# Patient Record
Sex: FEMALE
[De-identification: names, ages, dates, MRNs, and addresses within clinical notes are randomized per-mention and may not be internally consistent; named-entity substitution may affect disease eponyms.]

---

## 2023-07-21 ENCOUNTER — NURSE TRIAGE (OUTPATIENT)
Dept: OTHER | Facility: CLINIC | Age: 33
End: 2023-07-21

## 2023-07-21 NOTE — TELEPHONE ENCOUNTER
Location of patient: [place]    Subjective: Caller states \"I am concerned about sexual issues\"     Current Symptoms:Caller had intercourse with a male last night and thought the condom looked dirty and is concerned about STI's. Caller went to  today and prescribed antibiotics. Denies any symptoms    Onset: 1 day    Associated Symptoms: STI concern    Pain Severity: None    Temperature: NA    What has been tried: NA    LMP: NA Pregnant: NA    Recommended disposition: Home care, already saw provider    Care advice provided, patient verbalizes understanding; denies any other questions or concerns; instructed to call back for any new or worsening symptoms. This triage is a result of a call to [address] [place] [address]. Please do not respond to the triage nurse through this encounter. Any subsequent communication should be directly with the patient.       Reason for Disposition   Preventing STIs, questions about    Protocols used: STI Questions-ADULT-

## 2023-09-05 PROBLEM — R92.8 ABNORMAL MAMMOGRAM: Status: ACTIVE | Noted: 2023-09-05

## 2023-09-05 PROBLEM — N60.99 BENIGN BREAST DISEASE: Status: ACTIVE | Noted: 2023-09-05

## 2023-09-05 RX ORDER — MOMETASONE FUROATE 1 MG/G
1 OINTMENT TOPICAL 2 TIMES DAILY
COMMUNITY

## 2023-09-05 RX ORDER — KETOCONAZOLE 20 MG/G
1 CREAM TOPICAL 2 TIMES DAILY
COMMUNITY

## 2023-09-05 RX ORDER — MULTIVITAMIN
TABLET ORAL
COMMUNITY
Start: 2022-03-21

## 2023-09-05 RX ORDER — CLOBETASOL PROPIONATE 0.5 MG/G
OINTMENT TOPICAL
COMMUNITY

## 2023-12-11 NOTE — PROGRESS NOTES
Aliya Zambrano female   1990 33 y.o.   [id number]      Chief Complaint  Biopsy consultation    History Of Present Illness  Aliya Zambrano is a very pleasant 33 year old AA female [facility]. She has family history of breast cancer in her paternal half-sister, age 31 with recurrence. Her sister was genetically tested, BRCA negative per patient report. She had right breast stereotactic biopsy 2022 revealing benign breast tissue with focal mild PASH, probably concordant with short term mammographic follow up recommended. She is still interested in Genetic testing for herself with BRCA 1 and BRCA 2 testing only, but has not scheduled appointment as of yet.      BREAST IMAGIN2023 Bilateral diagnostic mammogram and right breast ultrasound, indicates BI-RADS Category 4. Right breast, indeterminate distortion without sonographic correlate. May represent worsening architecture distortion or evolving post biopsy changes. Biopsy recommended given no tissue marker identified.     FEMALE HISTORY: menarche age 11, nulliparous, OCP's x 2 years, premenopausal, LMP 2023, no contraception at this time, desires future pregnancies, regular cycles, scattered fibroglandular tissue     FAMILY CANCER HISTORY:  Paternal Half Sister: Breast cancer, age 31 with recurrence, BRCA negative, alive and well at 50     Surgical History  She has a past surgical history that includes Other surgical history (2022).     Social History  She has no history on file for tobacco use, alcohol use, and drug use.    Family History  Family History   Problem Relation Name Age of Onset    Breast cancer Father's Sister          Half-sister        Allergies  Patient has no known allergies.    Medications  Current Outpatient Medications   Medication Instructions    clobetasol (Temovate) 0.05 % ointment Topical    ketoconazole (NIZOral) 2 % cream 1 Application, Topical, 2 times daily, Until  clear    mometasone (Elocon) 0.1 % ointment 1 Application, Topical, 2 times daily, For 5 days a week as needed    multivitamin (Multiple Vitamins) tablet oral         REVIEW OF SYSTEMS    Constitutional:  Negative for appetite change, fatigue, fever and unexpected weight change.   HENT:  Negative for ear pain, hearing loss, nosebleeds, sore throat and trouble swallowing.    Eyes:  Negative for discharge, itching and visual disturbance.   Respiratory:  Negative for cough, chest tightness and shortness of breath.    Cardiovascular:  Negative for chest pain, palpitations and leg swelling.   Breast: as indicated in HPI  Gastrointestinal:  Negative for abdominal pain, constipation, diarrhea and nausea.   Endocrine: Negative for cold intolerance and heat intolerance.   Genitourinary:  Negative for dysuria, frequency, hematuria, pelvic pain and vaginal bleeding.   Musculoskeletal:  Negative for arthralgias, back pain, gait problem, joint swelling and myalgias.   Skin:  Negative for color change and rash.   Allergic/Immunologic: Negative for environmental allergies and food allergies.   Neurological:  Negative for dizziness, tremors, speech difficulty, weakness, numbness and headaches.   Hematological:  Does not bruise/bleed easily.   Psychiatric/Behavioral:  Negative for agitation, dysphoric mood and sleep disturbance. The patient is not nervous/anxious.         Past Medical History  She has no past medical history on file.     Physical Exam  Patient is alert and oriented x3 and in a relaxed and appropriate mood. Her gait is steady and hand grasps are equal. Sclera is clear. The breasts are nearly symmetrical, large and pendulous. The tissue is soft without palpable abnormalities, discrete nodules or masses. The left breast skin and both nipples appear normal. The right breast has mild rash at inframammary fold, possibly mild yeast infection. There is no cervical, supraclavicular or axillary lymphadenopathy. Heart rate and  rhythm normal, S1 and S2 appreciated. The lungs are clear to auscultation bilaterally. Abdomen is soft and non-tender.       Physical Exam     Last Recorded Vitals  There were no vitals filed for this visit.    Relevant Results   Time was spent viewing digital images of the radiology testing with the patient. I explained the results in depth, along with suggested explanation for follow up recommendations based on the testing results. BI-RADS Category ***    Imaging  BI mammo bilateral screening tomosynthesis 09/28/2023    Narrative  Interpreted By:  MARIO CARIAS MD  MRN: [id number]  Patient Name: SHALINI WOODSON    STUDY:  DIGITAL MAMM SCREENING W/ ABIGAIL;  9/28/2023 8:03 am    ACCESSION NUMBER(S):  [id number]    ORDERING CLINICIAN:  BRENNA DAMICO    INDICATION:  Screening. Benign right core biopsy.    COMPARISON:  02/20/2023, 09/08/2022, 08/03/2021    FINDINGS:  2D and tomosynthesis images were reviewed at 1 mm slice thickness.    Density:  The breast tissue is almost entirely fatty.    There has been progression of architectural distortion in the central  lateral right breast. No suspicious masses or calcifications are  identified in the left breast.    Impression  No mammographic evidence of malignancy in the left breast.  Progression of right breast architectural distortion.    BI-RADS CATEGORY:    BI-RADS Category:  0 Incomplete; Need Additional Imaging Evaluation  and/or Prior Mammograms for Comparison.  Recommendation:  Ultrasound.  Recommended Date:  Immediate.  Laterality:  Right.    For any future breast imaging appointments, please call [phone] ([phone]).    Patient letter sent SADEVAL    MACRO:  None       Assessment/Plan   High risk surveillance care, abnormal mammogram, benign right breast biopsy, family history of breast cancer, family history of BRCA negative, scattered fibroglandular tissue     Plan: Right breast stereotactic guided biopsy    Patient Discussion/Summary  I recommend a right  breast mammogram guided biopsy. A breast radiology physician will perform the procedure. Possible diagnoses include benign, atypia or cancer. Bruising and mild discomfort after the biopsy is normal and will improve. I typically have results in 3-5 business days. I will call you with the results, please have your phone handy to take my call. If you receive medical information from [facility] Personal Health Record, it is possible to view or see results of your biopsy or procedure before I contact you directly. I will provide recommendations for future follow up based on your biopsy results.     You can see your health information, review clinical summaries from office visits & test results online when you follow your health with MY  Chart, a personal health record. To sign up go to [email or web address]. If you need assistance with signing up or trouble getting into your account call [employer] Patient Line 24/7 at [phone].    Should you have any questions or concerns after biopsy, please do not hesitate to call my office at [phone]. If it has been more than a week since your biopsy was performed and you have not received results, please call my office at [phone]. Thank you for choosing [facility] and trusting me as your healthcare provider. I am honored to be a provider on your health care team and I remain dedicated to helping you achieve your health goals.    Krysten Zhao, APRN-CNP

## 2023-12-15 RX ORDER — FLUCONAZOLE 150 MG/1
TABLET ORAL
COMMUNITY
Start: 2023-09-15

## 2023-12-15 RX ORDER — AZITHROMYCIN 500 MG/1
TABLET, FILM COATED ORAL
COMMUNITY
Start: 2023-07-21

## 2023-12-15 RX ORDER — METRONIDAZOLE 500 MG/1
TABLET ORAL
COMMUNITY
Start: 2023-07-20

## 2023-12-18 ENCOUNTER — APPOINTMENT (OUTPATIENT)
Dept: SURGICAL ONCOLOGY | Facility: CLINIC | Age: 33
End: 2023-12-18
Payer: COMMERCIAL

## 2023-12-27 NOTE — PROGRESS NOTES
Aliya Zambrano female   1990 33 y.o.   [id number]      Chief Complaint  Biopsy consultation    History Of Present Illness  Aliya Zambrano is a very pleasant 33 year old AA female [facility]. She has family history of breast cancer in her paternal half-sister, age 31 with recurrence. Her sister was genetically tested, BRCA negative per patient report. She had right breast stereotactic biopsy 2022 revealing benign breast tissue with focal mild PASH, probably concordant with short term mammographic follow up recommended. She is still interested in Genetic testing for herself with BRCA 1 and BRCA 2 testing only, but has not scheduled appointment as of yet.      BREAST IMAGIN2023 Bilateral diagnostic mammogram and right breast ultrasound, indicates BI-RADS Category 4. Right breast, indeterminate distortion without sonographic correlate. May represent worsening architecture distortion or evolving post biopsy changes. Biopsy recommended given no tissue marker identified.     FEMALE HISTORY: menarche age 11, nulliparous, OCP's x 2 years, premenopausal, LMP 2023, no contraception at this time, desires future pregnancies, regular cycles, scattered fibroglandular tissue     FAMILY CANCER HISTORY:  Paternal Half Sister: Breast cancer, age 31 with recurrence, BRCA negative, alive and well at 50     Surgical History  She has a past surgical history that includes Other surgical history (2022).     Social History  She has no history on file for tobacco use, alcohol use, and drug use.    Family History  Family History   Problem Relation Name Age of Onset    Breast cancer Father's Sister          Half-sister        Allergies  Patient has no known allergies.    Medications  Current Outpatient Medications   Medication Instructions    azithromycin (Zithromax) 500 mg tablet TAKE 4 TABLETS BY MOUTH AS A SINGLE DOSE (WITH OR WITHOUT FOOD)    clobetasol (Temovate)  0.05 % ointment Topical    fluconazole (Diflucan) 150 mg tablet TAKE 1 TABLET BY MOUTH EVERY 72 HOURS FOR 3 DOSES    ketoconazole (NIZOral) 2 % cream 1 Application, Topical, 2 times daily, Until clear    metroNIDAZOLE (Flagyl) 500 mg tablet TAKE 4 TABLETS BY MOUTH EVERY DAY TAKEN TOGETHER    mometasone (Elocon) 0.1 % ointment 1 Application, Topical, 2 times daily, For 5 days a week as needed    multivitamin (Multiple Vitamins) tablet oral         REVIEW OF SYSTEMS    Constitutional:  Negative for appetite change, fatigue, fever and unexpected weight change.   HENT:  Negative for ear pain, hearing loss, nosebleeds, sore throat and trouble swallowing.    Eyes:  Negative for discharge, itching and visual disturbance.   Respiratory:  Negative for cough, chest tightness and shortness of breath.    Cardiovascular:  Negative for chest pain, palpitations and leg swelling.   Breast: as indicated in HPI  Gastrointestinal:  Negative for abdominal pain, constipation, diarrhea and nausea.   Endocrine: Negative for cold intolerance and heat intolerance.   Genitourinary:  Negative for dysuria, frequency, hematuria, pelvic pain and vaginal bleeding.   Musculoskeletal:  Negative for arthralgias, back pain, gait problem, joint swelling and myalgias.   Skin:  Negative for color change and rash.   Allergic/Immunologic: Negative for environmental allergies and food allergies.   Neurological:  Negative for dizziness, tremors, speech difficulty, weakness, numbness and headaches.   Hematological:  Does not bruise/bleed easily.   Psychiatric/Behavioral:  Negative for agitation, dysphoric mood and sleep disturbance. The patient is not nervous/anxious.         Past Medical History  She has no past medical history on file.     Physical Exam  Patient is alert and oriented x3 and in a relaxed and appropriate mood. Her gait is steady and hand grasps are equal. Sclera is clear. The breasts are nearly symmetrical, large and pendulous. The tissue is  soft without palpable abnormalities, discrete nodules or masses. The left breast skin and both nipples appear normal. The right breast has mild rash at inframammary fold, possibly mild yeast infection. There is no cervical, supraclavicular or axillary lymphadenopathy. Heart rate and rhythm normal, S1 and S2 appreciated. The lungs are clear to auscultation bilaterally. Abdomen is soft and non-tender.       Physical Exam     Last Recorded Vitals  There were no vitals filed for this visit.    Relevant Results   Time was spent viewing digital images of the radiology testing with the patient. I explained the results in depth, along with suggested explanation for follow up recommendations based on the testing results. BI-RADS Category ***    Imaging  BI mammo bilateral screening tomosynthesis 09/28/2023    Narrative  Interpreted By:  MARIO CARIAS MD  MRN: [id number]  Patient Name: SHALINI WOODSON    STUDY:  DIGITAL MAMM SCREENING W/ ABIGAIL;  9/28/2023 8:03 am    ACCESSION NUMBER(S):  [id number]    ORDERING CLINICIAN:  BRENNA DAMICO    INDICATION:  Screening. Benign right core biopsy.    COMPARISON:  02/20/2023, 09/08/2022, 08/03/2021    FINDINGS:  2D and tomosynthesis images were reviewed at 1 mm slice thickness.    Density:  The breast tissue is almost entirely fatty.    There has been progression of architectural distortion in the central  lateral right breast. No suspicious masses or calcifications are  identified in the left breast.    Impression  No mammographic evidence of malignancy in the left breast.  Progression of right breast architectural distortion.    BI-RADS CATEGORY:    BI-RADS Category:  0 Incomplete; Need Additional Imaging Evaluation  and/or Prior Mammograms for Comparison.  Recommendation:  Ultrasound.  Recommended Date:  Immediate.  Laterality:  Right.    For any future breast imaging appointments, please call [phone]).    Patient letter sent SADEVAL    MACRO:  None        Assessment/Plan   High risk surveillance care, abnormal mammogram, benign right breast biopsy, family history of breast cancer, family history of BRCA negative, scattered fibroglandular tissue     Plan: Right breast stereotactic guided biopsy    Patient Discussion/Summary  I recommend a right breast mammogram guided biopsy. A breast radiology physician will perform the procedure. Possible diagnoses include benign, atypia or cancer. Bruising and mild discomfort after the biopsy is normal and will improve. I typically have results in 3-5 business days. I will call you with the results, please have your phone handy to take my call. If you receive medical information from [facility] Personal Health Record, it is possible to view or see results of your biopsy or procedure before I contact you directly. I will provide recommendations for future follow up based on your biopsy results.     You can see your health information, review clinical summaries from office visits & test results online when you follow your health with MY  Chart, a personal health record. To sign up go to [email or web address]. If you need assistance with signing up or trouble getting into your account call [email or web address] Patient Line 24/7 at [phone].    Should you have any questions or concerns after biopsy, please do not hesitate to call my office at [phone]. If it has been more than a week since your biopsy was performed and you have not received results, please call my office at [phone]. Thank you for choosing [facility] and trusting me as your healthcare provider. I am honored to be a provider on your health care team and I remain dedicated to helping you achieve your health goals.    Krysten Zhao, APRN-CNP

## 2024-01-04 ENCOUNTER — APPOINTMENT (OUTPATIENT)
Dept: SURGICAL ONCOLOGY | Facility: CLINIC | Age: 34
End: 2024-01-04
Payer: COMMERCIAL

## 2024-01-31 NOTE — PROGRESS NOTES
Aliya Zambrano female   1990 33 y.o.   [id number]      Chief Complaint  Biopsy consultation    History Of Present Illness  Aliya Zambrano is a very pleasant 32 year old AA female [facility]. She has family history of breast cancer in her paternal half-sister, age 31 with recurrence. Her sister was genetically tested, BRCA negative per patient report. She had right breast stereotactic biopsy 2022 revealing benign breast tissue with focal mild PASH, probably concordant with short term mammographic follow up recommended. She is still interested in Genetic testing for herself with BRCA 1 and BRCA 2 testing only, but has not scheduled appointment as of yet.      BREAST IMAGIN2023 Bilateral screening mammogram, indicates BI-RADS Category 0. Progression of right breast distortion warranting additional views. 2023 Right diagnostic mammogram and ultrasound, indicates BI-RADS Category 4. Right breast, indeterminate architectural distortion without sonographic correlate warranting stereotactic guided biopsy given that there is no tissue biopsy marker to correlate with the previous biopsy site.      FEMALE HISTORY: menarche age 11, nulliparous, OCP's x 2 years, premenopausal, LMP 2023, no contraception at this time, desires future pregnancies, regular cycles, scattered fibroglandular tissue     FAMILY CANCER HISTORY:  Paternal Half Sister: Breast cancer, age 31 with recurrence, BRCA negative, alive and well at 50     Surgical History  She has a past surgical history that includes Other surgical history (2022).     Social History  She has no history on file for tobacco use, alcohol use, and drug use.    Family History  Family History   Problem Relation Name Age of Onset    Breast cancer Father's Sister          Half-sister        Allergies  Patient has no known allergies.    Medications  Current Outpatient Medications   Medication Instructions     azithromycin (Zithromax) 500 mg tablet TAKE 4 TABLETS BY MOUTH AS A SINGLE DOSE (WITH OR WITHOUT FOOD)    clobetasol (Temovate) 0.05 % ointment Topical    fluconazole (Diflucan) 150 mg tablet TAKE 1 TABLET BY MOUTH EVERY 72 HOURS FOR 3 DOSES    ketoconazole (NIZOral) 2 % cream 1 Application, Topical, 2 times daily, Until clear    metroNIDAZOLE (Flagyl) 500 mg tablet TAKE 4 TABLETS BY MOUTH EVERY DAY TAKEN TOGETHER    mometasone (Elocon) 0.1 % ointment 1 Application, Topical, 2 times daily, For 5 days a week as needed    multivitamin (Multiple Vitamins) tablet oral         REVIEW OF SYSTEMS    Constitutional:  Negative for appetite change, fatigue, fever and unexpected weight change.   HENT:  Negative for ear pain, hearing loss, nosebleeds, sore throat and trouble swallowing.    Eyes:  Negative for discharge, itching and visual disturbance.   Respiratory:  Negative for cough, chest tightness and shortness of breath.    Cardiovascular:  Negative for chest pain, palpitations and leg swelling.   Breast: as indicated in HPI  Gastrointestinal:  Negative for abdominal pain, constipation, diarrhea and nausea.   Endocrine: Negative for cold intolerance and heat intolerance.   Genitourinary:  Negative for dysuria, frequency, hematuria, pelvic pain and vaginal bleeding.   Musculoskeletal:  Negative for arthralgias, back pain, gait problem, joint swelling and myalgias.   Skin:  Negative for color change and rash.   Allergic/Immunologic: Negative for environmental allergies and food allergies.   Neurological:  Negative for dizziness, tremors, speech difficulty, weakness, numbness and headaches.   Hematological:  Does not bruise/bleed easily.   Psychiatric/Behavioral:  Negative for agitation, dysphoric mood and sleep disturbance. The patient is not nervous/anxious.         Past Medical History  She has no past medical history on file.     Physical Exam  Patient is alert and oriented x3 and in a relaxed and appropriate mood. Her  gait is steady and hand grasps are equal. Sclera is clear. The breasts are nearly symmetrical. The tissue is soft without palpable abnormalities, discrete nodules or masses. The skin and nipples appear normal. There is no cervical, supraclavicular or axillary lymphadenopathy. Heart rate and rhythm normal, S1 and S2 appreciated. The lungs are clear to auscultation bilaterally. Abdomen is soft and non-tender.       Physical Exam     Last Recorded Vitals  There were no vitals filed for this visit.    Relevant Results   Time was spent viewing digital images of the radiology testing with the patient. I explained the results in depth, along with suggested explanation for follow up recommendations based on the testing results. BI-RADS Category 4    Imaging  Narrative & Impression   Interpreted By:  NOREEN DALE MD  MRN: [id number]  Patient Name: SHALINI WOODSON     STUDY:  DIGITAL DIAG MAMM RIGHT UNIL W/ ABIGAIL; BREAST ULTRASOUND;  9/28/2023  10:04 am; 9/28/2023 10:47 am     ACCESSION NUMBER(S):  [id number]; [id number]     ORDERING CLINICIAN:  BRENNA DAMICO     INDICATION:  Progression of right breast architecture distortion on screening  mammogram. History of right breast benign core needle biopsy.     COMPARISON:  02/20/2023, 09/09/2022, 09/08/2022, and priors dating back to 2021     FINDINGS:  MAMMOGRAPHY: 2D and tomosynthesis images were reviewed at 1 mm slice  thickness.     Density: The breast tissue is almost entirely fatty.     There has been interval mild worsening of the architecture distortion  in the lateral central right breast at middle depth. No biopsy tissue  marker is seen in the right breast. No suspicious masses or  calcifications are identified in the remainder of the right breast.     ULTRASOUND:  Targeted ultrasound examination with elastography of the entire  lateral and entire central right breast demonstrates unremarkable  breast parenchyma without abnormalities.     IMPRESSION:  Indeterminate  right breast architectural distortion without  sonographic correlate. This may represent worsening architecture  distortion or evolving post biopsy changes, however, given that there  is no biopsy tissue marker in the right breast to correlate with the  previous biopsy site, tomosynthesis guided biopsy is recommended for  definitive tissue diagnosis. Findings and recommendations were  discussed with the patient and with the patient's CMP Brenna Zhao  by Dr. Carr. A preprocedure form was completed.     BI-RADS CATEGORY:     Category: 4 - Suspicious.  Recommendation: Biopsy Recommended.       BI mammo bilateral screening tomosynthesis 09/28/2023    Narrative  Interpreted By:  MARIO CARIAS MD  MRN: [id number]  Patient Name: SHALINI WOODSON    STUDY:  DIGITAL MAMM SCREENING W/ ABIGAIL;  9/28/2023 8:03 am    ACCESSION NUMBER(S):  [id number]    ORDERING CLINICIAN:  BRENNA ZHAO    INDICATION:  Screening. Benign right core biopsy.    COMPARISON:  02/20/2023, 09/08/2022, 08/03/2021    FINDINGS:  2D and tomosynthesis images were reviewed at 1 mm slice thickness.    Density:  The breast tissue is almost entirely fatty.    There has been progression of architectural distortion in the central  lateral right breast. No suspicious masses or calcifications are  identified in the left breast.    Impression  No mammographic evidence of malignancy in the left breast.  Progression of right breast architectural distortion.    BI-RADS CATEGORY:    BI-RADS Category:  0 Incomplete; Need Additional Imaging Evaluation  and/or Prior Mammograms for Comparison.  Recommendation:  Ultrasound.  Recommended Date:  Immediate.  Laterality:  Right.    For any future breast imaging appointments, please call [phone]).    Patient letter sent SADEVAL    MACRO:  None       Assessment/Plan   High risk surveillance care, normal clinical exam and imaging, benign right breast biopsy, family history of breast cancer, family history  of BRCA negative, scattered fibroglandular tissue     Plan:     Patient Discussion/Summary  I recommend biopsy. A breast radiology physician will perform the procedure. Possible diagnoses include benign, atypia or cancer. Bruising and mild discomfort after the biopsy is normal and will improve. I typically have results in 3-5 business days. I will call you with the results, please have your phone handy to take my call. If you receive medical information from [facility] Personal Health Record, it is possible to view or see results of your biopsy or procedure before I contact you directly. I will provide recommendations for future follow up based on your biopsy results.     You can see your health information, review clinical summaries from office visits & test results online when you follow your health with MY  Chart, a personal health record. To sign up go to [email or web address]. If you need assistance with signing up or trouble getting into your account call [email or web address] Patient Line 24/7 at [phone].    Should you have any questions or concerns after biopsy, please do not hesitate to call my office at [phone]. If it has been more than a week since your biopsy was performed and you have not received results, please call my office at [phone]. Thank you for choosing [facility] and trusting me as your healthcare provider. I am honored to be a provider on your health care team and I remain dedicated to helping you achieve your health goals.    Krysten Zhao, APRN-CNP

## 2024-02-08 ENCOUNTER — APPOINTMENT (OUTPATIENT)
Dept: SURGICAL ONCOLOGY | Facility: CLINIC | Age: 34
End: 2024-02-08
Payer: COMMERCIAL

## 2024-02-08 ENCOUNTER — APPOINTMENT (OUTPATIENT)
Dept: RADIOLOGY | Facility: CLINIC | Age: 34
End: 2024-02-08
Payer: COMMERCIAL

## 2024-02-28 NOTE — PROGRESS NOTES
Alyia Zambrano female   1990 33 y.o.   [id number]      Chief Complaint  Right breast distortion    History Of Present Illness  Aliya Zambrano is a very pleasant 33 year old AA female [facility] care and right breast distortion. She has family history of breast cancer in her paternal half-sister, age 31 with recurrence. Her sister was genetically tested, BRCA negative per patient report. She had right breast stereotactic biopsy 2022 revealing benign breast tissue with focal mild PASH. The clip was not placed. She is still interested in Genetic testing for herself with BRCA 1 and BRCA 2 testing only, but has not scheduled appointment as of yet. She also reports her mother was recently diagnosed with breast cancer, age 60.      BREAST IMAGIN2023 Bilateral screening mammogram, indicates BI-RADS Category 0. Progression of right breast distortion warranting additional views. 2023 Right diagnostic mammogram and ultrasound, indicates BI-RADS Category 4. Right breast, indeterminate architectural distortion without sonographic correlate warranting stereotactic guided biopsy given that there is no tissue biopsy marker to correlate with the previous biopsy site.      FEMALE HISTORY: menarche age 11, nulliparous, OCP's x 2 years, premenopausal, LMP 2024, no contraception at this time, desires future pregnancies, regular cycles, scattered fibroglandular tissue     FAMILY CANCER HISTORY:  Mother: Breast cancer, age 60  Paternal Half Sister: Breast cancer, age 31 with recurrence, BRCA negative, alive and well at 50     Surgical History  She has a past surgical history that includes Other surgical history (2022) and Breast biopsy (Right, ).     Social History  She has no history on file for tobacco use, alcohol use, and drug use.    Family History  Family History   Problem Relation Name Age of Onset    Breast cancer Father's Sister          Half-sister         Allergies  Penicillins    Medications  Current Outpatient Medications   Medication Instructions    azithromycin (Zithromax) 500 mg tablet TAKE 4 TABLETS BY MOUTH AS A SINGLE DOSE (WITH OR WITHOUT FOOD)    clobetasol (Temovate) 0.05 % ointment Topical    fluconazole (Diflucan) 150 mg tablet TAKE 1 TABLET BY MOUTH EVERY 72 HOURS FOR 3 DOSES    ketoconazole (NIZOral) 2 % cream 1 Application, Topical, 2 times daily, Until clear    metroNIDAZOLE (Flagyl) 500 mg tablet TAKE 4 TABLETS BY MOUTH EVERY DAY TAKEN TOGETHER    mometasone (Elocon) 0.1 % ointment 1 Application, Topical, 2 times daily, For 5 days a week as needed    multivitamin (Multiple Vitamins) tablet oral         REVIEW OF SYSTEMS    Constitutional:  Negative for appetite change, fatigue, fever and unexpected weight change.   HENT:  Negative for ear pain, hearing loss, nosebleeds, sore throat and trouble swallowing.    Eyes:  Negative for discharge, itching and visual disturbance.   Respiratory:  Negative for cough, chest tightness and shortness of breath.    Cardiovascular:  Negative for chest pain, palpitations and leg swelling.   Breast: as indicated in HPI  Gastrointestinal:  Negative for abdominal pain, constipation, diarrhea and nausea.   Endocrine: Negative for cold intolerance and heat intolerance.   Genitourinary:  Negative for dysuria, frequency, hematuria, pelvic pain and vaginal bleeding.   Musculoskeletal:  Negative for arthralgias, back pain, gait problem, joint swelling and myalgias.   Skin:  Negative for color change and rash.   Allergic/Immunologic: Negative for environmental allergies and food allergies.   Neurological:  Negative for dizziness, tremors, speech difficulty, weakness, numbness and headaches.   Hematological:  Does not bruise/bleed easily.   Psychiatric/Behavioral:  Negative for agitation, dysphoric mood and sleep disturbance. The patient is not nervous/anxious.         Past Medical History  She has no past medical history  on file.     Physical Exam  Patient is alert and oriented x3 and in a relaxed and appropriate mood. Her gait is steady and hand grasps are equal. Sclera is clear. The breasts are nearly symmetrical. The tissue is soft without palpable abnormalities, discrete nodules or masses. The skin and nipples appear normal. There is no cervical, supraclavicular or axillary lymphadenopathy. Heart rate and rhythm normal, S1 and S2 appreciated. The lungs are clear to auscultation bilaterally. Abdomen is soft and non-tender.       Physical Exam     Last Recorded Vitals  Vitals:    03/07/24 1009   BP: (!) 142/100   Pulse: 100       Relevant Results   Time was spent viewing digital images of the radiology testing with the patient. I explained the results in depth, along with suggested explanation for follow up recommendations based on the testing results. BI-RADS Category 3    Imaging     Narrative & Impression   Interpreted By:  Noa Amaya,   STUDY:  BI MAMMO RIGHT DIAGNOSTIC TOMOSYNTHESIS;  3/7/2024 9:58 am      ACCESSION NUMBER(S):  [id number]      ORDERING CLINICIAN:  BRENNA DAMICO      INDICATION:  Signs/Symptoms:f/u. Short-term follow-up of right breast  architectural distortion. The area of distortion correlates with a  site of previous benign breast biopsy with results of benign breast  tissue and focal mild pseudoangiomatous stromal hyperplasia.. The  clip did not deploy at the time of biopsy. A recommendation for  repeat tomosynthesis guided biopsy has been deferred in lieu of  follow-up.      COMPARISON:  Right mammogram and ultrasound 09/28/2023, right mammogram  02/20/2023, right breast biopsy 09/09/2022, right mammogram  09/08/2022, 08/03/2021      FINDINGS:  2D and tomosynthesis images were reviewed at 1 mm slice thickness.      Density:  There are areas of scattered fibroglandular tissue.      There is a stable area of architectural distortion in the lateral  central right breast, similar to previous studies.  The remainder of  the right breast is unremarkable.      IMPRESSION:  Stable architectural distortion, lateral central right breast,  correlating with the site of previous biopsy. Continued mammographic  follow-up in 6 months recommended.      BI-RADS CATEGORY:      BI-RADS Category:  3 Probably Benign.  Recommendation:  Short-term Interval Follow-up Imaging.  Recommended Date:  6 Months.  Laterality:  Right.       Assessment/Plan   High risk surveillance care, normal clinical exam, stable imaging, benign right breast biopsy, right breast stable distortion, family history of breast cancer, family history of BRCA negative, scattered fibroglandular tissue     Plan: Return in September 2024 for bilateral diagnostic mammogram and office visit. Referred to Genetics.     Patient Discussion/Summary  Your clinical examination and imaging are stable. Please return in September 2024 for bilateral diagnostic mammogram and office visit or sooner if you have any problems or concerns.     You can see your health information, review clinical summaries from office visits & test results online when you follow your health with MY  Chart, a personal health record. To sign up go to [email or web address]. If you need assistance with signing up or trouble getting into your account call [facility] Patient Line 24/7 at [phone].    My office phone number is [phone] if you need to get in touch with me or have additional questions or concerns. Thank you for choosing [facility] and trusting me as your healthcare provider. I look forward to seeing you again at your next office visit. I am honored to be a provider on your health care team and I remain dedicated to helping you achieve your health goals.    Krysten Zhao, APRN-CNP

## 2024-03-07 ENCOUNTER — HOSPITAL ENCOUNTER (OUTPATIENT)
Dept: RADIOLOGY | Facility: CLINIC | Age: 34
Discharge: HOME | End: 2024-03-07
Payer: MEDICARE

## 2024-03-07 ENCOUNTER — OFFICE VISIT (OUTPATIENT)
Dept: SURGICAL ONCOLOGY | Facility: CLINIC | Age: 34
End: 2024-03-07
Payer: MEDICARE

## 2024-03-07 VITALS
WEIGHT: 254 LBS | BODY MASS INDEX: 45 KG/M2 | DIASTOLIC BLOOD PRESSURE: 100 MMHG | HEART RATE: 100 BPM | SYSTOLIC BLOOD PRESSURE: 142 MMHG | HEIGHT: 63 IN

## 2024-03-07 VITALS — WEIGHT: 251.32 LBS | HEIGHT: 63 IN | BODY MASS INDEX: 44.53 KG/M2

## 2024-03-07 DIAGNOSIS — R92.8 ABNORMAL MAMMOGRAM: ICD-10-CM

## 2024-03-07 DIAGNOSIS — Z12.39 BREAST CANCER SCREENING, HIGH RISK PATIENT: Primary | ICD-10-CM

## 2024-03-07 PROBLEM — F41.9 ANXIETY: Status: ACTIVE | Noted: 2020-05-04

## 2024-03-07 PROBLEM — F43.10 POSTTRAUMATIC STRESS DISORDER: Status: ACTIVE | Noted: 2020-05-04

## 2024-03-07 PROBLEM — Z77.21 EXPOSURE TO BLOOD-BORNE PATHOGEN: Status: ACTIVE | Noted: 2024-02-23

## 2024-03-07 PROBLEM — F32.A DEPRESSIVE DISORDER: Status: ACTIVE | Noted: 2020-05-04

## 2024-03-07 PROCEDURE — 99213 OFFICE O/P EST LOW 20 MIN: CPT | Mod: 25 | Performed by: NURSE PRACTITIONER

## 2024-03-07 PROCEDURE — 77061 BREAST TOMOSYNTHESIS UNI: CPT | Mod: RT

## 2024-03-07 PROCEDURE — 77061 BREAST TOMOSYNTHESIS UNI: CPT | Mod: RIGHT SIDE | Performed by: RADIOLOGY

## 2024-03-07 PROCEDURE — 77065 DX MAMMO INCL CAD UNI: CPT | Mod: RIGHT SIDE | Performed by: RADIOLOGY

## 2024-03-07 PROCEDURE — 99213 OFFICE O/P EST LOW 20 MIN: CPT | Performed by: NURSE PRACTITIONER

## 2024-03-07 ASSESSMENT — PAIN SCALES - GENERAL: PAINLEVEL: 0-NO PAIN

## 2024-03-07 NOTE — PATIENT INSTRUCTIONS
Your clinical examination and imaging are stable. Please return in September 2024 for bilateral diagnostic mammogram and office visit or sooner if you have any problems or concerns.     You can see your health information, review clinical summaries from office visits & test results online when you follow your health with MY  Chart, a personal health record. To sign up go to [email or web address]. If you need assistance with signing up or trouble getting into your account call [employer] Patient Line 24/7 at [phone].    My office phone number is [phone] if you need to get in touch with me or have additional questions or concerns. Thank you for choosing [facility] and trusting me as your healthcare provider. I look forward to seeing you again at your next office visit. I am honored to be a provider on your health care team and I remain dedicated to helping you achieve your health goals.

## 2024-09-09 ENCOUNTER — APPOINTMENT (OUTPATIENT)
Dept: SURGICAL ONCOLOGY | Facility: CLINIC | Age: 34
End: 2024-09-09
Payer: COMMERCIAL

## 2024-09-09 ENCOUNTER — APPOINTMENT (OUTPATIENT)
Dept: RADIOLOGY | Facility: CLINIC | Age: 34
End: 2024-09-09
Payer: COMMERCIAL

## 2024-09-24 NOTE — PROGRESS NOTES
Aliya Zambrano female   1990 34 y.o.   [id number]      Chief Complaint  Right breast distortion    History Of Present Illness  Aliya Zambrano is a very pleasant 34 year old AA female [facility] and right breast distortion. She has family history of breast cancer in her paternal half-sister, age 31 with recurrence. Her sister was genetically tested, BRCA negative per patient report. She had right breast stereotactic biopsy 2022 revealing benign breast tissue with focal mild PASH. The clip was not placed. She is still interested in Genetic testing for herself with BRCA 1 and BRCA 2 testing only, but has not scheduled appointment as of yet. She also reports her mother was recently diagnosed with breast cancer, age 60.      BREAST IMAGIN2023 Bilateral screening mammogram, indicates BI-RADS Category 0. Progression of right breast distortion warranting additional views. 2023 Right diagnostic mammogram and ultrasound, indicates BI-RADS Category 4. Right breast, indeterminate architectural distortion without sonographic correlate warranting stereotactic guided biopsy given that there is no tissue biopsy marker to correlate with the previous biopsy site. 3/7/2024 Right diagnostic mammogram and ultrasound, indicates BI-RADS Category 3. Right breast, stable architectural distortion warranting short term follow up.      FEMALE HISTORY: menarche age 11, nulliparous, OCP's x 2 years, premenopausal, LMP 2024, no contraception at this time, desires future pregnancies, regular cycles, scattered fibroglandular tissue     FAMILY CANCER HISTORY:  Mother: Breast cancer, age 60  Paternal Half Sister: Breast cancer, age 31 with recurrence, BRCA negative, alive and well at 50     Surgical History  She has a past surgical history that includes Other surgical history (2022) and Breast biopsy (Right, ).     Social History  She has no history on file for  tobacco use, alcohol use, and drug use.    Family History  Family History   Problem Relation Name Age of Onset    Breast cancer Father's Sister          Half-sister        Allergies  Penicillins    Medications  Current Outpatient Medications   Medication Instructions    azithromycin (Zithromax) 500 mg tablet TAKE 4 TABLETS BY MOUTH AS A SINGLE DOSE (WITH OR WITHOUT FOOD)    clobetasol (Temovate) 0.05 % ointment Topical    fluconazole (Diflucan) 150 mg tablet TAKE 1 TABLET BY MOUTH EVERY 72 HOURS FOR 3 DOSES    ketoconazole (NIZOral) 2 % cream 1 Application, Topical, 2 times daily, Until clear    metroNIDAZOLE (Flagyl) 500 mg tablet TAKE 4 TABLETS BY MOUTH EVERY DAY TAKEN TOGETHER    mometasone (Elocon) 0.1 % ointment 1 Application, Topical, 2 times daily, For 5 days a week as needed    multivitamin (Multiple Vitamins) tablet oral         REVIEW OF SYSTEMS    Constitutional:  Negative for appetite change, fatigue, fever and unexpected weight change.   HENT:  Negative for ear pain, hearing loss, nosebleeds, sore throat and trouble swallowing.    Eyes:  Negative for discharge, itching and visual disturbance.   Respiratory:  Negative for cough, chest tightness and shortness of breath.    Cardiovascular:  Negative for chest pain, palpitations and leg swelling.   Breast: as indicated in HPI  Gastrointestinal:  Negative for abdominal pain, constipation, diarrhea and nausea.   Endocrine: Negative for cold intolerance and heat intolerance.   Genitourinary:  Negative for dysuria, frequency, hematuria, pelvic pain and vaginal bleeding.   Musculoskeletal:  Negative for arthralgias, back pain, gait problem, joint swelling and myalgias.   Skin:  Negative for color change and rash.   Allergic/Immunologic: Negative for environmental allergies and food allergies.   Neurological:  Negative for dizziness, tremors, speech difficulty, weakness, numbness and headaches.   Hematological:  Does not bruise/bleed easily.    Psychiatric/Behavioral:  Negative for agitation, dysphoric mood and sleep disturbance. The patient is not nervous/anxious.         Past Medical History  She has no past medical history on file.     Physical Exam  Patient is alert and oriented x3 and in a relaxed and appropriate mood. Her gait is steady and hand grasps are equal. Sclera is clear. The breasts are nearly symmetrical. The tissue is soft without palpable abnormalities, discrete nodules or masses. The skin and nipples appear normal. There is no cervical, supraclavicular or axillary lymphadenopathy. Heart rate and rhythm normal, S1 and S2 appreciated. The lungs are clear to auscultation bilaterally. Abdomen is soft and non-tender.       Physical Exam     Last Recorded Vitals  There were no vitals filed for this visit.      Relevant Results   Time was spent viewing digital images of the radiology testing with the patient. I explained the results in depth, along with suggested explanation for follow up recommendations based on the testing results. BI-RADS Category 3    Imaging     Narrative & Impression   Interpreted By:  Noa Amaya,   STUDY:  BI MAMMO RIGHT DIAGNOSTIC TOMOSYNTHESIS;  3/7/2024 9:58 am      ACCESSION NUMBER(S):  [id number]      ORDERING CLINICIAN:  BRENNA DAMICO      INDICATION:  Signs/Symptoms:f/u. Short-term follow-up of right breast  architectural distortion. The area of distortion correlates with a  site of previous benign breast biopsy with results of benign breast  tissue and focal mild pseudoangiomatous stromal hyperplasia.. The  clip did not deploy at the time of biopsy. A recommendation for  repeat tomosynthesis guided biopsy has been deferred in lieu of  follow-up.      COMPARISON:  Right mammogram and ultrasound 09/28/2023, right mammogram  02/20/2023, right breast biopsy 09/09/2022, right mammogram  09/08/2022, 08/03/2021      FINDINGS:  2D and tomosynthesis images were reviewed at 1 mm slice thickness.      Density:   There are areas of scattered fibroglandular tissue.      There is a stable area of architectural distortion in the lateral  central right breast, similar to previous studies. The remainder of  the right breast is unremarkable.      IMPRESSION:  Stable architectural distortion, lateral central right breast,  correlating with the site of previous biopsy. Continued mammographic  follow-up in 6 months recommended.      BI-RADS CATEGORY:      BI-RADS Category:  3 Probably Benign.  Recommendation:  Short-term Interval Follow-up Imaging.  Recommended Date:  6 Months.  Laterality:  Right.       Assessment/Plan   High risk surveillance care, normal clinical exam, stable imaging, benign right breast biopsy, right breast stable distortion, family history of breast cancer, family history of BRCA negative, scattered fibroglandular tissue     Plan: Return in for bilateral diagnostic mammogram and office visit. Referred to Genetics.     Patient Discussion/Summary  Your clinical examination and imaging are stable. Please return in September 2024 for bilateral diagnostic mammogram and office visit or sooner if you have any problems or concerns.     You can see your health information, review clinical summaries from office visits & test results online when you follow your health with MY  Chart, a personal health record. To sign up go to [email or web address]. If you need assistance with signing up or trouble getting into your account call [facility] Patient Line 24/7 at [phone].    My office phone number is [phone] if you need to get in touch with me or have additional questions or concerns. Thank you for choosing [facility] and trusting me as your healthcare provider. I look forward to seeing you again at your next office visit. I am honored to be a provider on your health care team and I remain dedicated to helping you achieve your health goals.    Krysten Zhao, APRN-CNP

## 2024-10-17 ENCOUNTER — APPOINTMENT (OUTPATIENT)
Dept: SURGICAL ONCOLOGY | Facility: CLINIC | Age: 34
End: 2024-10-17
Payer: COMMERCIAL

## 2024-10-17 ENCOUNTER — APPOINTMENT (OUTPATIENT)
Dept: RADIOLOGY | Facility: CLINIC | Age: 34
End: 2024-10-17
Payer: COMMERCIAL

## 2024-10-30 NOTE — PROGRESS NOTES
Aliya Zambrano female   1990 34 y.o.   [id number]      Chief Complaint  Right breast distortion    History Of Present Illness  Aliya Zambrano is a very pleasant 34 year old AA female [facility] and right breast distortion. She has family history of breast cancer in her paternal half-sister, age 31 with recurrence. Her sister was genetically tested, BRCA negative per patient report. She had right breast stereotactic biopsy 2022 revealing benign breast tissue with focal mild PASH. The clip was not placed. She is still interested in Genetic testing for herself with BRCA 1 and BRCA 2 testing only, but has not scheduled appointment as of yet. She also reports her mother was recently diagnosed with breast cancer, age 60.      BREAST IMAGIN2023 Bilateral screening mammogram, indicates BI-RADS Category 0. Progression of right breast distortion warranting additional views. 2023 Right diagnostic mammogram and ultrasound, indicates BI-RADS Category 4. Right breast, indeterminate architectural distortion without sonographic correlate warranting stereotactic guided biopsy given that there is no tissue biopsy marker to correlate with the previous biopsy site. 3/7/2024 Right diagnostic mammogram and ultrasound, indicates BI-RADS Category 3. Right breast, stable architectural distortion warranting short term follow up.      FEMALE HISTORY: menarche age 11, nulliparous, OCP's x 2 years, premenopausal, LMP 2024, no contraception at this time, desires future pregnancies, regular cycles, scattered fibroglandular tissue     FAMILY CANCER HISTORY:  Mother: Breast cancer, age 60  Paternal Half Sister: Breast cancer, age 31 with recurrence, BRCA negative, alive and well at 50     Surgical History  She has a past surgical history that includes Other surgical history (2022) and Breast biopsy (Right, ).     Social History  She has no history on file for  tobacco use, alcohol use, and drug use.    Family History  Family History   Problem Relation Name Age of Onset    Breast cancer Father's Sister          Half-sister        Allergies  Penicillins    Medications  Current Outpatient Medications   Medication Instructions    azithromycin (Zithromax) 500 mg tablet TAKE 4 TABLETS BY MOUTH AS A SINGLE DOSE (WITH OR WITHOUT FOOD)    clobetasol (Temovate) 0.05 % ointment Topical    fluconazole (Diflucan) 150 mg tablet TAKE 1 TABLET BY MOUTH EVERY 72 HOURS FOR 3 DOSES    ketoconazole (NIZOral) 2 % cream 1 Application, Topical, 2 times daily, Until clear    metroNIDAZOLE (Flagyl) 500 mg tablet TAKE 4 TABLETS BY MOUTH EVERY DAY TAKEN TOGETHER    mometasone (Elocon) 0.1 % ointment 1 Application, Topical, 2 times daily, For 5 days a week as needed    multivitamin (Multiple Vitamins) tablet oral         REVIEW OF SYSTEMS    Constitutional:  Negative for appetite change, fatigue, fever and unexpected weight change.   HENT:  Negative for ear pain, hearing loss, nosebleeds, sore throat and trouble swallowing.    Eyes:  Negative for discharge, itching and visual disturbance.   Respiratory:  Negative for cough, chest tightness and shortness of breath.    Cardiovascular:  Negative for chest pain, palpitations and leg swelling.   Breast: as indicated in HPI  Gastrointestinal:  Negative for abdominal pain, constipation, diarrhea and nausea.   Endocrine: Negative for cold intolerance and heat intolerance.   Genitourinary:  Negative for dysuria, frequency, hematuria, pelvic pain and vaginal bleeding.   Musculoskeletal:  Negative for arthralgias, back pain, gait problem, joint swelling and myalgias.   Skin:  Negative for color change and rash.   Allergic/Immunologic: Negative for environmental allergies and food allergies.   Neurological:  Negative for dizziness, tremors, speech difficulty, weakness, numbness and headaches.   Hematological:  Does not bruise/bleed easily.    Psychiatric/Behavioral:  Negative for agitation, dysphoric mood and sleep disturbance. The patient is not nervous/anxious.         Past Medical History  She has no past medical history on file.     Physical Exam  Patient is alert and oriented x3 and in a relaxed and appropriate mood. Her gait is steady and hand grasps are equal. Sclera is clear. The breasts are nearly symmetrical. The tissue is soft without palpable abnormalities, discrete nodules or masses. The skin and nipples appear normal. There is no cervical, supraclavicular or axillary lymphadenopathy. Heart rate and rhythm normal, S1 and S2 appreciated. The lungs are clear to auscultation bilaterally. Abdomen is soft and non-tender.       Physical Exam     Last Recorded Vitals  There were no vitals filed for this visit.      Relevant Results   Time was spent viewing digital images of the radiology testing with the patient. I explained the results in depth, along with suggested explanation for follow up recommendations based on the testing results. BI-RADS Category 3    Imaging     Narrative & Impression   Interpreted By:  Noa Amaya,   STUDY:  BI MAMMO RIGHT DIAGNOSTIC TOMOSYNTHESIS;  3/7/2024 9:58 am      ACCESSION NUMBER(S):  [id number]      ORDERING CLINICIAN:  BRENNA DAMICO      INDICATION:  Signs/Symptoms:f/u. Short-term follow-up of right breast  architectural distortion. The area of distortion correlates with a  site of previous benign breast biopsy with results of benign breast  tissue and focal mild pseudoangiomatous stromal hyperplasia.. The  clip did not deploy at the time of biopsy. A recommendation for  repeat tomosynthesis guided biopsy has been deferred in lieu of  follow-up.      COMPARISON:  Right mammogram and ultrasound 09/28/2023, right mammogram  02/20/2023, right breast biopsy 09/09/2022, right mammogram  09/08/2022, 08/03/2021      FINDINGS:  2D and tomosynthesis images were reviewed at 1 mm slice thickness.      Density:   There are areas of scattered fibroglandular tissue.      There is a stable area of architectural distortion in the lateral  central right breast, similar to previous studies. The remainder of  the right breast is unremarkable.      IMPRESSION:  Stable architectural distortion, lateral central right breast,  correlating with the site of previous biopsy. Continued mammographic  follow-up in 6 months recommended.      BI-RADS CATEGORY:      BI-RADS Category:  3 Probably Benign.  Recommendation:  Short-term Interval Follow-up Imaging.  Recommended Date:  6 Months.  Laterality:  Right.       Assessment/Plan   High risk surveillance care, normal clinical exam, stable imaging, benign right breast biopsy, right breast stable distortion, family history of breast cancer, family history of BRCA negative, scattered fibroglandular tissue     Plan: Return in for bilateral diagnostic mammogram and office visit. Referred to Genetics.     Patient Discussion/Summary  Your clinical examination and imaging are stable. Please return in September 2024 for bilateral diagnostic mammogram and office visit or sooner if you have any problems or concerns.     You can see your health information, review clinical summaries from office visits & test results online when you follow your health with MY  Chart, a personal health record. To sign up go to [email or web address]. If you need assistance with signing up or trouble getting into your account call [facility] Patient Line 24/7 at [phone].    My office phone number is [phone] if you need to get in touch with me or have additional questions or concerns. Thank you for choosing [facility] and trusting me as your healthcare provider. I look forward to seeing you again at your next office visit. I am honored to be a provider on your health care team and I remain dedicated to helping you achieve your health goals.    Krysten Zhao, APRN-CNP

## 2024-11-07 ENCOUNTER — APPOINTMENT (OUTPATIENT)
Dept: SURGICAL ONCOLOGY | Facility: CLINIC | Age: 34
End: 2024-11-07
Payer: COMMERCIAL

## 2024-11-07 ENCOUNTER — APPOINTMENT (OUTPATIENT)
Dept: RADIOLOGY | Facility: CLINIC | Age: 34
End: 2024-11-07
Payer: COMMERCIAL

## 2025-01-06 ENCOUNTER — APPOINTMENT (OUTPATIENT)
Dept: SURGICAL ONCOLOGY | Facility: CLINIC | Age: 35
End: 2025-01-06
Payer: COMMERCIAL

## 2025-01-06 ENCOUNTER — APPOINTMENT (OUTPATIENT)
Dept: RADIOLOGY | Facility: CLINIC | Age: 35
End: 2025-01-06
Payer: COMMERCIAL

## 2025-01-08 NOTE — PROGRESS NOTES
Aliya Zambrano female   1990 34 y.o.   [id number]      Chief Complaint  Right breast distortion    History Of Present Illness  Aliya Zambrano is a very pleasant 34 year old AA female [facility] care and right breast distortion. She has family history of breast cancer in her paternal half-sister, age 31 with recurrence. Her sister was genetically tested, BRCA negative per patient report. She had right breast stereotactic biopsy 2022 revealing benign breast tissue with focal mild PASH. The clip was not placed. She is still interested in Genetic testing for herself with BRCA 1 and BRCA 2 testing only, but has not scheduled appointment as of yet. She also reports her mother was recently diagnosed with breast cancer, age 60.      BREAST IMAGIN2023 Bilateral screening mammogram, indicates BI-RADS Category 0. Progression of right breast distortion warranting additional views. 2023 Right diagnostic mammogram and ultrasound, indicates BI-RADS Category 4. Right breast, indeterminate architectural distortion without sonographic correlate warranting stereotactic guided biopsy given that there is no tissue biopsy marker to correlate with the previous biopsy site.      FEMALE HISTORY: menarche age 11, nulliparous, OCP's x 2 years, premenopausal, LMP 2024, no contraception at this time, desires future pregnancies, regular cycles, scattered fibroglandular tissue     FAMILY CANCER HISTORY:  Mother: Breast cancer, age 60  Paternal Half Sister: Breast cancer, age 31 with recurrence, BRCA negative, alive and well at 50     Surgical History  She has a past surgical history that includes Other surgical history (2022) and Breast biopsy (Right, ).     Social History  She has no history on file for tobacco use, alcohol use, and drug use.    Family History  Family History   Problem Relation Name Age of Onset    Breast cancer Father's Sister          Half-sister         Allergies  Penicillins    Medications  Current Outpatient Medications   Medication Instructions    azithromycin (Zithromax) 500 mg tablet TAKE 4 TABLETS BY MOUTH AS A SINGLE DOSE (WITH OR WITHOUT FOOD)    clobetasol (Temovate) 0.05 % ointment Topical    fluconazole (Diflucan) 150 mg tablet TAKE 1 TABLET BY MOUTH EVERY 72 HOURS FOR 3 DOSES    ketoconazole (NIZOral) 2 % cream 1 Application, Topical, 2 times daily, Until clear    metroNIDAZOLE (Flagyl) 500 mg tablet TAKE 4 TABLETS BY MOUTH EVERY DAY TAKEN TOGETHER    mometasone (Elocon) 0.1 % ointment 1 Application, Topical, 2 times daily, For 5 days a week as needed    multivitamin (Multiple Vitamins) tablet oral         REVIEW OF SYSTEMS    Constitutional:  Negative for appetite change, fatigue, fever and unexpected weight change.   HENT:  Negative for ear pain, hearing loss, nosebleeds, sore throat and trouble swallowing.    Eyes:  Negative for discharge, itching and visual disturbance.   Respiratory:  Negative for cough, chest tightness and shortness of breath.    Cardiovascular:  Negative for chest pain, palpitations and leg swelling.   Breast: as indicated in HPI  Gastrointestinal:  Negative for abdominal pain, constipation, diarrhea and nausea.   Endocrine: Negative for cold intolerance and heat intolerance.   Genitourinary:  Negative for dysuria, frequency, hematuria, pelvic pain and vaginal bleeding.   Musculoskeletal:  Negative for arthralgias, back pain, gait problem, joint swelling and myalgias.   Skin:  Negative for color change and rash.   Allergic/Immunologic: Negative for environmental allergies and food allergies.   Neurological:  Negative for dizziness, tremors, speech difficulty, weakness, numbness and headaches.   Hematological:  Does not bruise/bleed easily.   Psychiatric/Behavioral:  Negative for agitation, dysphoric mood and sleep disturbance. The patient is not nervous/anxious.         Past Medical History  She has no past medical history  on file.     Physical Exam  Patient is alert and oriented x3 and in a relaxed and appropriate mood. Her gait is steady and hand grasps are equal. Sclera is clear. The breasts are nearly symmetrical. The tissue is soft without palpable abnormalities, discrete nodules or masses. The skin and nipples appear normal. There is no cervical, supraclavicular or axillary lymphadenopathy. Heart rate and rhythm normal, S1 and S2 appreciated. The lungs are clear to auscultation bilaterally. Abdomen is soft and non-tender.       Physical Exam     Last Recorded Vitals  There were no vitals filed for this visit.      Relevant Results   Time was spent viewing digital images of the radiology testing with the patient. I explained the results in depth, along with suggested explanation for follow up recommendations based on the testing results. BI-RADS Category 3    Imaging     Narrative & Impression   Interpreted By:  Noa Amaya,   STUDY:  BI MAMMO RIGHT DIAGNOSTIC TOMOSYNTHESIS;  3/7/2024 9:58 am      ACCESSION NUMBER(S):  [id number]      ORDERING CLINICIAN:  BRENNA DAMICO      INDICATION:  Signs/Symptoms:f/u. Short-term follow-up of right breast  architectural distortion. The area of distortion correlates with a  site of previous benign breast biopsy with results of benign breast  tissue and focal mild pseudoangiomatous stromal hyperplasia.. The  clip did not deploy at the time of biopsy. A recommendation for  repeat tomosynthesis guided biopsy has been deferred in lieu of  follow-up.      COMPARISON:  Right mammogram and ultrasound 09/28/2023, right mammogram  02/20/2023, right breast biopsy 09/09/2022, right mammogram  09/08/2022, 08/03/2021      FINDINGS:  2D and tomosynthesis images were reviewed at 1 mm slice thickness.      Density:  There are areas of scattered fibroglandular tissue.      There is a stable area of architectural distortion in the lateral  central right breast, similar to previous studies. The remainder  of  the right breast is unremarkable.      IMPRESSION:  Stable architectural distortion, lateral central right breast,  correlating with the site of previous biopsy. Continued mammographic  follow-up in 6 months recommended.      BI-RADS CATEGORY:      BI-RADS Category:  3 Probably Benign.  Recommendation:  Short-term Interval Follow-up Imaging.  Recommended Date:  6 Months.  Laterality:  Right.       Assessment/Plan   High risk surveillance care, normal clinical exam, stable imaging, benign right breast biopsy, right breast stable distortion, family history of breast cancer, family history of BRCA negative, scattered fibroglandular tissue     Plan: Return in September 2024 for bilateral diagnostic mammogram and office visit. Referred to Genetics.     Patient Discussion/Summary  Your clinical examination and imaging are stable. Please return in September 2024 for bilateral diagnostic mammogram and office visit or sooner if you have any problems or concerns.     You can see your health information, review clinical summaries from office visits & test results online when you follow your health with MY  Chart, a personal health record. To sign up go to [email or web address]. If you need assistance with signing up or trouble getting into your account call [facility] Patient Line 24/7 at [phone].    My office phone number is [phone] if you need to get in touch with me or have additional questions or concerns. Thank you for choosing [facility] and trusting me as your healthcare provider. I look forward to seeing you again at your next office visit. I am honored to be a provider on your health care team and I remain dedicated to helping you achieve your health goals.    Krysten Zhao, APRN-CNP

## 2025-01-14 ENCOUNTER — APPOINTMENT (OUTPATIENT)
Dept: SURGICAL ONCOLOGY | Facility: HOSPITAL | Age: 35
End: 2025-01-14
Payer: COMMERCIAL

## 2025-01-27 ENCOUNTER — APPOINTMENT (OUTPATIENT)
Dept: SURGICAL ONCOLOGY | Facility: CLINIC | Age: 35
End: 2025-01-27
Payer: COMMERCIAL

## 2025-02-04 NOTE — PROGRESS NOTES
Aliya Zambrano female   1990 34 y.o.   [id number]      Chief Complaint  Right breast distortion, clinical exam, high risk surveillance care    History Of Present Illness  Aliya Zambrano is a very pleasant 34 year old AA female [facility] care and right breast distortion. She has family history of breast cancer in her paternal half-sister, age 31 with recurrence. Her sister was genetically tested, BRCA negative per patient report. She had right breast stereotactic biopsy 2022 revealing benign breast tissue with focal mild PASH. The clip was not placed at that time. She is still interested in Genetic testing for herself with BRCA 1 and BRCA 2 testing only, but has not scheduled appointment as of yet. She also reports her mother was recently diagnosed with breast cancer, age 60.     She presents today for clinical exam. She is currently being followed for a stable right breast distortion. She also reports a left breast mass first noticed in 2024 around her ovulation. She states it flares up with her heating pad.      BREAST IMAGIN2023 Bilateral screening mammogram, indicates BI-RADS Category 0. Progression of right breast distortion warranting additional views. 2023 Right diagnostic mammogram and ultrasound, indicates BI-RADS Category 4. Right breast, indeterminate architectural distortion without sonographic correlate warranting stereotactic guided biopsy given that there is no tissue biopsy marker to correlate with the previous biopsy site. 3/7/2024 Right diagnostic mammogram, indicates BI-RADS Category 3. Right breast, stable architectural distortion warranting short term follow up.      FEMALE HISTORY: menarche age 11, nulliparous, OCP's x 2 years, premenopausal, no contraception at this time, desires future pregnancies, regular cycles, scattered fibroglandular tissue     FAMILY CANCER HISTORY:  Mother: Breast cancer, age 60  Paternal Half  Sister: Breast cancer, age 31 with recurrence, BRCA negative, alive and well at 50     Surgical History  She has a past surgical history that includes Other surgical history (03/21/2022) and Breast biopsy (Right, 2022).     Social History  She reports that she has never smoked. She has never used smokeless tobacco. She reports that she does not currently use alcohol. She reports that she does not use drugs.    Family History  Family History   Problem Relation Name Age of Onset    Breast cancer Father's Sister          Half-sister    Breast cancer Mother Dreema     Hypertension Father Zackery     Stroke Maternal Grandfather Shlomo     Breast cancer Sister Mary     Diabetes Father's Brother Terell         Allergies  Penicillins    Medications  Current Outpatient Medications   Medication Instructions    clobetasol (Temovate) 0.05 % ointment Topical    ketoconazole (NIZOral) 2 % cream 1 Application, Topical, 2 times daily, Until clear    mometasone (Elocon) 0.1 % ointment 1 Application, Topical, 2 times daily, For 5 days a week as needed    multivitamin (Multiple Vitamins) tablet oral         REVIEW OF SYSTEMS    Constitutional:  Negative for appetite change, fatigue, fever and unexpected weight change.   HENT:  Negative for ear pain, hearing loss, nosebleeds, sore throat and trouble swallowing.    Eyes:  Negative for discharge, itching and visual disturbance.   Respiratory:  Negative for cough, chest tightness and shortness of breath.    Cardiovascular:  Negative for chest pain, palpitations and leg swelling.   Breast: as indicated in HPI  Gastrointestinal:  Negative for abdominal pain, constipation, diarrhea and nausea.   Endocrine: Negative for cold intolerance and heat intolerance.   Genitourinary:  Negative for dysuria, frequency, hematuria, pelvic pain and vaginal bleeding.   Musculoskeletal:  Negative for arthralgias, back pain, gait problem, joint swelling and myalgias.   Skin:  Negative for color change and  rash.   Allergic/Immunologic: Negative for environmental allergies and food allergies.   Neurological:  Negative for dizziness, tremors, speech difficulty, weakness, numbness and headaches.   Hematological:  Does not bruise/bleed easily.   Psychiatric/Behavioral:  Negative for agitation, dysphoric mood and sleep disturbance. The patient is not nervous/anxious.         Past Medical History  She has no past medical history on file.     Physical Exam  Patient is alert and oriented x3 and in a relaxed and appropriate mood. Her gait is steady and hand grasps are equal. Sclera is clear. The breasts are nearly symmetrical, large and pendulous. The tissue is soft without palpable abnormalities, discrete nodules or masses. The skin and nipples appear normal. There is no evidence of left breast mass. There is no cervical, supraclavicular or axillary lymphadenopathy. Heart rate and rhythm normal, S1 and S2 appreciated. The lungs are clear to auscultation bilaterally. Abdomen is soft and non-tender.       Physical Exam     Last Recorded Vitals  Vitals:    02/19/25 1503   BP: 132/78   Pulse: 100   Resp: 16         Relevant Results   Time was spent discussing digital images of the radiology testing with the patient. I explained the results in depth, along with suggested explanation for follow up recommendations based on the testing results. BI-RADS Category 3    Imaging     Narrative & Impression   Interpreted By:  Noa Amaya,   STUDY:  BI MAMMO RIGHT DIAGNOSTIC TOMOSYNTHESIS;  3/7/2024 9:58 am      ACCESSION NUMBER(S):  [id number]      ORDERING CLINICIAN:  BRENNA DAMICO      INDICATION:  Signs/Symptoms:f/u. Short-term follow-up of right breast  architectural distortion. The area of distortion correlates with a  site of previous benign breast biopsy with results of benign breast  tissue and focal mild pseudoangiomatous stromal hyperplasia.. The  clip did not deploy at the time of biopsy. A recommendation for  repeat  tomosynthesis guided biopsy has been deferred in lieu of  follow-up.      COMPARISON:  Right mammogram and ultrasound 09/28/2023, right mammogram  02/20/2023, right breast biopsy 09/09/2022, right mammogram  09/08/2022, 08/03/2021      FINDINGS:  2D and tomosynthesis images were reviewed at 1 mm slice thickness.      Density:  There are areas of scattered fibroglandular tissue.      There is a stable area of architectural distortion in the lateral  central right breast, similar to previous studies. The remainder of  the right breast is unremarkable.      IMPRESSION:  Stable architectural distortion, lateral central right breast,  correlating with the site of previous biopsy. Continued mammographic  follow-up in 6 months recommended.      BI-RADS CATEGORY:      BI-RADS Category:  3 Probably Benign.  Recommendation:  Short-term Interval Follow-up Imaging.  Recommended Date:  6 Months.  Laterality:  Right.       Assessment/Plan   High risk surveillance care, normal clinical exam, stable imaging, benign right breast biopsy, right breast stable distortion, family history of breast cancer, family history of BRCA negative, scattered fibroglandular tissue     Plan: Schedule bilateral diagnostic mammogram, call with results. Thoroughly discussed recommendation for follow up imaging. Reassured normal exam.     Patient Discussion/Summary  Your clinical examination is normal and imaging from March 2024 is stable. Please schedule a bilateral diagnostic mammogram, I will call you with the results.     You can see your health information, review clinical summaries from office visits & test results online when you follow your health with MY  Chart, a personal health record. To sign up go to [email or web address]. If you need assistance with signing up or trouble getting into your account call [employer] Patient Line 24/7 at [phone].    My office phone number is [phone] if you need to get in touch with me or have  additional questions or concerns. Thank you for choosing [facility] and trusting me as your healthcare provider. I look forward to seeing you again at your next office visit. I am honored to be a provider on your health care team and I remain dedicated to helping you achieve your health goals.    Krysten Zhao, APRN-CNP

## 2025-02-17 ENCOUNTER — APPOINTMENT (OUTPATIENT)
Dept: OBSTETRICS AND GYNECOLOGY | Facility: CLINIC | Age: 35
End: 2025-02-17
Payer: COMMERCIAL

## 2025-02-19 ENCOUNTER — OFFICE VISIT (OUTPATIENT)
Dept: SURGICAL ONCOLOGY | Facility: HOSPITAL | Age: 35
End: 2025-02-19
Payer: MEDICARE

## 2025-02-19 VITALS
SYSTOLIC BLOOD PRESSURE: 132 MMHG | BODY MASS INDEX: 46.95 KG/M2 | RESPIRATION RATE: 16 BRPM | WEIGHT: 265 LBS | DIASTOLIC BLOOD PRESSURE: 78 MMHG | HEART RATE: 100 BPM | HEIGHT: 63 IN

## 2025-02-19 DIAGNOSIS — Z12.39 BREAST CANCER SCREENING, HIGH RISK PATIENT: ICD-10-CM

## 2025-02-19 DIAGNOSIS — R92.8 ABNORMAL MAMMOGRAM: Primary | ICD-10-CM

## 2025-02-19 PROCEDURE — 99213 OFFICE O/P EST LOW 20 MIN: CPT | Performed by: NURSE PRACTITIONER

## 2025-02-19 PROCEDURE — 3008F BODY MASS INDEX DOCD: CPT | Performed by: NURSE PRACTITIONER

## 2025-02-19 NOTE — PATIENT INSTRUCTIONS
Your clinical examination is normal and imaging from March 2024 is stable. Please schedule a bilateral diagnostic mammogram, I will call you with the results.     You can see your health information, review clinical summaries from office visits & test results online when you follow your health with MY  Chart, a personal health record. To sign up go to [email or web address]. If you need assistance with signing up or trouble getting into your account call [facility] Patient Line 24/7 at [phone].    My office phone number is [phone] if you need to get in touch with me or have additional questions or concerns. Thank you for choosing [facility] and trusting me as your healthcare provider. I look forward to seeing you again at your next office visit. I am honored to be a provider on your health care team and I remain dedicated to helping you achieve your health goals.

## 2025-03-18 ENCOUNTER — APPOINTMENT (OUTPATIENT)
Dept: RADIOLOGY | Facility: HOSPITAL | Age: 35
End: 2025-03-18
Payer: MEDICARE

## 2025-04-15 ENCOUNTER — HOSPITAL ENCOUNTER (OUTPATIENT)
Dept: RADIOLOGY | Facility: HOSPITAL | Age: 35
Discharge: HOME | End: 2025-04-15
Payer: MEDICARE

## 2025-04-15 VITALS — HEIGHT: 63 IN | BODY MASS INDEX: 46.95 KG/M2 | WEIGHT: 265 LBS

## 2025-04-15 DIAGNOSIS — Z12.39 BREAST CANCER SCREENING, HIGH RISK PATIENT: ICD-10-CM

## 2025-04-15 PROCEDURE — 77062 BREAST TOMOSYNTHESIS BI: CPT
